# Patient Record
Sex: FEMALE | URBAN - METROPOLITAN AREA
[De-identification: names, ages, dates, MRNs, and addresses within clinical notes are randomized per-mention and may not be internally consistent; named-entity substitution may affect disease eponyms.]

---

## 2018-05-17 ENCOUNTER — APPOINTMENT (RX ONLY)
Dept: URBAN - METROPOLITAN AREA CLINIC 93 | Facility: CLINIC | Age: 58
Setting detail: DERMATOLOGY
End: 2018-05-17

## 2018-05-17 DIAGNOSIS — D22 MELANOCYTIC NEVI: ICD-10-CM

## 2018-05-17 DIAGNOSIS — L81.5 LEUKODERMA, NOT ELSEWHERE CLASSIFIED: ICD-10-CM

## 2018-05-17 DIAGNOSIS — L81.4 OTHER MELANIN HYPERPIGMENTATION: ICD-10-CM

## 2018-05-17 DIAGNOSIS — D18.0 HEMANGIOMA: ICD-10-CM

## 2018-05-17 DIAGNOSIS — L57.8 OTHER SKIN CHANGES DUE TO CHRONIC EXPOSURE TO NONIONIZING RADIATION: ICD-10-CM

## 2018-05-17 DIAGNOSIS — L57.0 ACTINIC KERATOSIS: ICD-10-CM

## 2018-05-17 PROBLEM — D18.01 HEMANGIOMA OF SKIN AND SUBCUTANEOUS TISSUE: Status: ACTIVE | Noted: 2018-05-17

## 2018-05-17 PROBLEM — D22.71 MELANOCYTIC NEVI OF RIGHT LOWER LIMB, INCLUDING HIP: Status: ACTIVE | Noted: 2018-05-17

## 2018-05-17 PROBLEM — D22.39 MELANOCYTIC NEVI OF OTHER PARTS OF FACE: Status: ACTIVE | Noted: 2018-05-17

## 2018-05-17 PROCEDURE — 99214 OFFICE O/P EST MOD 30 MIN: CPT | Mod: 25

## 2018-05-17 PROCEDURE — ? COUNSELING

## 2018-05-17 PROCEDURE — ? LIQUID NITROGEN

## 2018-05-17 PROCEDURE — 17000 DESTRUCT PREMALG LESION: CPT

## 2018-05-17 ASSESSMENT — LOCATION ZONE DERM
LOCATION ZONE: FACE
LOCATION ZONE: LEG
LOCATION ZONE: TRUNK
LOCATION ZONE: NECK

## 2018-05-17 ASSESSMENT — LOCATION SIMPLE DESCRIPTION DERM
LOCATION SIMPLE: LEFT ANTERIOR NECK
LOCATION SIMPLE: ABDOMEN
LOCATION SIMPLE: LEFT KNEE
LOCATION SIMPLE: LEFT CHEEK
LOCATION SIMPLE: RIGHT UPPER BACK
LOCATION SIMPLE: UPPER BACK
LOCATION SIMPLE: LEFT PRETIBIAL REGION
LOCATION SIMPLE: RIGHT PRETIBIAL REGION

## 2018-05-17 ASSESSMENT — LOCATION DETAILED DESCRIPTION DERM
LOCATION DETAILED: RIGHT DISTAL PRETIBIAL REGION
LOCATION DETAILED: LEFT INFERIOR CENTRAL MALAR CHEEK
LOCATION DETAILED: LEFT CENTRAL MALAR CHEEK
LOCATION DETAILED: LEFT PROXIMAL PRETIBIAL REGION
LOCATION DETAILED: LEFT KNEE
LOCATION DETAILED: SUPERIOR THORACIC SPINE
LOCATION DETAILED: PERIUMBILICAL SKIN
LOCATION DETAILED: RIGHT MEDIAL UPPER BACK
LOCATION DETAILED: LEFT INFERIOR ANTERIOR NECK

## 2018-05-17 NOTE — HPI: SKIN LESIONS
How Severe Is Your Skin Lesion?: mild
Have Your Skin Lesions Been Treated?: not been treated
Is This A New Presentation, Or A Follow-Up?: Skin Lesions
Additional History: LOV 1/10/18

## 2019-02-27 ENCOUNTER — APPOINTMENT (RX ONLY)
Dept: URBAN - METROPOLITAN AREA CLINIC 93 | Facility: CLINIC | Age: 59
Setting detail: DERMATOLOGY
End: 2019-02-27

## 2019-02-27 DIAGNOSIS — L81.5 LEUKODERMA, NOT ELSEWHERE CLASSIFIED: ICD-10-CM

## 2019-02-27 DIAGNOSIS — L57.8 OTHER SKIN CHANGES DUE TO CHRONIC EXPOSURE TO NONIONIZING RADIATION: ICD-10-CM

## 2019-02-27 DIAGNOSIS — L57.0 ACTINIC KERATOSIS: ICD-10-CM

## 2019-02-27 DIAGNOSIS — D18.0 HEMANGIOMA: ICD-10-CM

## 2019-02-27 DIAGNOSIS — L82.0 INFLAMED SEBORRHEIC KERATOSIS: ICD-10-CM

## 2019-02-27 DIAGNOSIS — L81.4 OTHER MELANIN HYPERPIGMENTATION: ICD-10-CM

## 2019-02-27 PROBLEM — D18.01 HEMANGIOMA OF SKIN AND SUBCUTANEOUS TISSUE: Status: ACTIVE | Noted: 2019-02-27

## 2019-02-27 PROBLEM — D48.5 NEOPLASM OF UNCERTAIN BEHAVIOR OF SKIN: Status: ACTIVE | Noted: 2019-02-27

## 2019-02-27 PROCEDURE — 17000 DESTRUCT PREMALG LESION: CPT | Mod: 59

## 2019-02-27 PROCEDURE — ? LIQUID NITROGEN

## 2019-02-27 PROCEDURE — 99214 OFFICE O/P EST MOD 30 MIN: CPT | Mod: 25

## 2019-02-27 PROCEDURE — ? BIOPSY BY SHAVE METHOD

## 2019-02-27 PROCEDURE — 11102 TANGNTL BX SKIN SINGLE LES: CPT | Mod: 59

## 2019-02-27 PROCEDURE — ? COUNSELING

## 2019-02-27 PROCEDURE — 17110 DESTRUCTION B9 LES UP TO 14: CPT

## 2019-02-27 ASSESSMENT — LOCATION ZONE DERM
LOCATION ZONE: TRUNK
LOCATION ZONE: LEG
LOCATION ZONE: ARM

## 2019-02-27 ASSESSMENT — LOCATION SIMPLE DESCRIPTION DERM
LOCATION SIMPLE: LEFT POSTERIOR UPPER ARM
LOCATION SIMPLE: LEFT CLAVICULAR SKIN
LOCATION SIMPLE: RIGHT PRETIBIAL REGION
LOCATION SIMPLE: ABDOMEN
LOCATION SIMPLE: RIGHT POSTERIOR UPPER ARM
LOCATION SIMPLE: CHEST
LOCATION SIMPLE: LEFT UPPER BACK
LOCATION SIMPLE: UPPER BACK
LOCATION SIMPLE: RIGHT CLAVICULAR SKIN
LOCATION SIMPLE: LEFT PRETIBIAL REGION
LOCATION SIMPLE: LEFT SHOULDER

## 2019-02-27 ASSESSMENT — LOCATION DETAILED DESCRIPTION DERM
LOCATION DETAILED: RIGHT PROXIMAL PRETIBIAL REGION
LOCATION DETAILED: LEFT SUPERIOR MEDIAL UPPER BACK
LOCATION DETAILED: RIGHT PROXIMAL POSTERIOR UPPER ARM
LOCATION DETAILED: EPIGASTRIC SKIN
LOCATION DETAILED: RIGHT CLAVICULAR SKIN
LOCATION DETAILED: LEFT CLAVICULAR SKIN
LOCATION DETAILED: LEFT PROXIMAL PRETIBIAL REGION
LOCATION DETAILED: LEFT POSTERIOR SHOULDER
LOCATION DETAILED: LEFT PROXIMAL POSTERIOR UPPER ARM
LOCATION DETAILED: LEFT SUPERIOR UPPER BACK
LOCATION DETAILED: INFERIOR THORACIC SPINE
LOCATION DETAILED: RIGHT MEDIAL SUPERIOR CHEST

## 2019-02-27 NOTE — PROCEDURE: LIQUID NITROGEN
Medical Necessity Clause: This procedure was medically necessary because the lesions that were treated were:
Add 52 Modifier (Optional): no
Number Of Freeze-Thaw Cycles: 1 freeze-thaw cycle
Medical Necessity Information: It is in your best interest to select a reason for this procedure from the list below. All of these items fulfill various CMS LCD requirements except the new and changing color options.
Post-Care Instructions: I reviewed with the patient in detail post-care instructions. Patient is to wear sunprotection, and avoid picking at any of the treated lesions. Pt may apply Vaseline to crusted or scabbing areas.
Consent: The patient's consent was obtained including but not limited to risks of crusting, scabbing, blistering, scarring, darker or lighter pigmentary change, recurrence, incomplete removal and infection.
Detail Level: Detailed
Duration Of Freeze Thaw-Cycle (Seconds): 0

## 2019-02-27 NOTE — PROCEDURE: BIOPSY BY SHAVE METHOD
Notification Instructions: Patient will be notified of biopsy results. However, patient instructed to call the office if not contacted within 2 weeks.
Biopsy Method: scissors
Hemostasis: Drysol
Bill For Surgical Tray: no
Dressing: bandage
Was A Bandage Applied: Yes
Silver Nitrate Text: The wound bed was treated with silver nitrate after the biopsy was performed.
Anesthesia Type: 1% lidocaine with epinephrine
Cryotherapy Text: The wound bed was treated with cryotherapy after the biopsy was performed.
Detail Level: Detailed
Curettage Text: The wound bed was treated with curettage after the biopsy was performed.
Lab: 6
Consent: Written consent was obtained and risks were reviewed including but not limited to scarring, infection, bleeding, scabbing, incomplete removal, nerve damage and allergy to anesthesia.
Wound Care: Petrolatum
Electrodesiccation Text: The wound bed was treated with electrodesiccation after the biopsy was performed.
Depth Of Biopsy: dermis
X Size Of Lesion In Cm: 0
Lab Facility: [id number]
Size Of Lesion In Cm: 0.4
Anesthesia Volume In Cc (Will Not Render If 0): 0.5
Electrodesiccation And Curettage Text: The wound bed was treated with electrodesiccation and curettage after the biopsy was performed.
Post-Care Instructions: I reviewed with the patient in detail post-care instructions. Patient is to keep the biopsy site dry overnight, and then apply bacitracin twice daily until healed. Patient may apply hydrogen peroxide soaks to remove any crusting.
Biopsy Type: H and E
Billing Type: Third-Party Bill
Type Of Destruction Used: Curettage

## 2019-03-20 ENCOUNTER — APPOINTMENT (RX ONLY)
Dept: URBAN - METROPOLITAN AREA CLINIC 93 | Facility: CLINIC | Age: 59
Setting detail: DERMATOLOGY
End: 2019-03-20

## 2019-03-20 DIAGNOSIS — Z41.9 ENCOUNTER FOR PROCEDURE FOR PURPOSES OTHER THAN REMEDYING HEALTH STATE, UNSPECIFIED: ICD-10-CM

## 2019-03-20 PROCEDURE — ? BOTOX (U OR CC)

## 2019-03-20 ASSESSMENT — LOCATION SIMPLE DESCRIPTION DERM
LOCATION SIMPLE: RIGHT TEMPLE
LOCATION SIMPLE: LEFT TEMPLE
LOCATION SIMPLE: LEFT CHEEK
LOCATION SIMPLE: GLABELLA
LOCATION SIMPLE: RIGHT CHEEK
LOCATION SIMPLE: RIGHT EYEBROW
LOCATION SIMPLE: RIGHT FOREHEAD
LOCATION SIMPLE: LEFT EYEBROW
LOCATION SIMPLE: LEFT FOREHEAD

## 2019-03-20 ASSESSMENT — LOCATION DETAILED DESCRIPTION DERM
LOCATION DETAILED: RIGHT MEDIAL EYEBROW
LOCATION DETAILED: LEFT SUPERIOR CENTRAL MALAR CHEEK
LOCATION DETAILED: LEFT INFERIOR TEMPLE
LOCATION DETAILED: LEFT FOREHEAD
LOCATION DETAILED: GLABELLA
LOCATION DETAILED: RIGHT SUPERIOR LATERAL MALAR CHEEK
LOCATION DETAILED: RIGHT INFERIOR TEMPLE
LOCATION DETAILED: LEFT CENTRAL EYEBROW
LOCATION DETAILED: LEFT MEDIAL FOREHEAD
LOCATION DETAILED: LEFT MID TEMPLE
LOCATION DETAILED: RIGHT FOREHEAD
LOCATION DETAILED: LEFT SUPERIOR LATERAL MALAR CHEEK
LOCATION DETAILED: RIGHT SUPERIOR FOREHEAD
LOCATION DETAILED: RIGHT CENTRAL EYEBROW

## 2019-03-20 ASSESSMENT — LOCATION ZONE DERM: LOCATION ZONE: FACE

## 2019-04-18 ENCOUNTER — APPOINTMENT (RX ONLY)
Dept: URBAN - METROPOLITAN AREA CLINIC 93 | Facility: CLINIC | Age: 59
Setting detail: DERMATOLOGY
End: 2019-04-18

## 2019-04-18 PROBLEM — C44.519 BASAL CELL CARCINOMA OF SKIN OF OTHER PART OF TRUNK: Status: ACTIVE | Noted: 2019-04-18

## 2019-04-18 PROCEDURE — 13101 CMPLX RPR TRUNK 2.6-7.5 CM: CPT

## 2019-04-18 PROCEDURE — 11602 EXC TR-EXT MAL+MARG 1.1-2 CM: CPT

## 2019-04-18 PROCEDURE — ? PRESCRIPTION

## 2019-04-18 PROCEDURE — ? EXCISION

## 2019-04-18 RX ORDER — CEFADROXIL 500 MG/1
CAPSULE ORAL
Qty: 14 | Refills: 0 | Status: ERX | COMMUNITY
Start: 2019-04-18

## 2019-04-18 RX ADMIN — CEFADROXIL: 500 CAPSULE ORAL at 00:00

## 2019-04-18 NOTE — PROCEDURE: EXCISION
no Bilobed Transposition Flap Text: The defect edges were debeveled with a #15 scalpel blade.  Given the location of the defect and the proximity to free margins a bilobed transposition flap was deemed most appropriate.  Using a sterile surgical marker, an appropriate bilobe flap drawn around the defect.    The area thus outlined was incised deep to adipose tissue with a #15 scalpel blade.  The skin margins were undermined to an appropriate distance in all directions utilizing iris scissors.

## 2019-05-02 ENCOUNTER — APPOINTMENT (RX ONLY)
Dept: URBAN - METROPOLITAN AREA CLINIC 93 | Facility: CLINIC | Age: 59
Setting detail: DERMATOLOGY
End: 2019-05-02

## 2019-05-02 DIAGNOSIS — Z48.02 ENCOUNTER FOR REMOVAL OF SUTURES: ICD-10-CM

## 2019-05-02 PROCEDURE — ? SUTURE REMOVAL (GLOBAL PERIOD)

## 2019-05-02 PROCEDURE — 99024 POSTOP FOLLOW-UP VISIT: CPT

## 2019-05-02 ASSESSMENT — LOCATION ZONE DERM: LOCATION ZONE: TRUNK

## 2019-05-02 ASSESSMENT — LOCATION SIMPLE DESCRIPTION DERM: LOCATION SIMPLE: LEFT UPPER BACK

## 2019-05-02 ASSESSMENT — LOCATION DETAILED DESCRIPTION DERM: LOCATION DETAILED: LEFT SUPERIOR UPPER BACK

## 2019-05-02 NOTE — PROCEDURE: SUTURE REMOVAL (GLOBAL PERIOD)
Add 40699 Cpt? (Important Note: In 2017 The Use Of 57402 Is Being Tracked By Cms To Determine Future Global Period Reimbursement For Global Periods): yes
Body Location Override (Optional - Billing Will Still Be Based On Selected Body Map Location If Applicable): left superior upper back
Detail Level: Detailed

## 2019-09-16 ENCOUNTER — APPOINTMENT (RX ONLY)
Dept: URBAN - METROPOLITAN AREA CLINIC 164 | Facility: CLINIC | Age: 59
Setting detail: DERMATOLOGY
End: 2019-09-16

## 2019-09-16 DIAGNOSIS — Z85.828 PERSONAL HISTORY OF OTHER MALIGNANT NEOPLASM OF SKIN: ICD-10-CM

## 2019-09-16 DIAGNOSIS — L82.1 OTHER SEBORRHEIC KERATOSIS: ICD-10-CM

## 2019-09-16 DIAGNOSIS — L81.4 OTHER MELANIN HYPERPIGMENTATION: ICD-10-CM

## 2019-09-16 DIAGNOSIS — L72.8 OTHER FOLLICULAR CYSTS OF THE SKIN AND SUBCUTANEOUS TISSUE: ICD-10-CM

## 2019-09-16 DIAGNOSIS — D22 MELANOCYTIC NEVI: ICD-10-CM

## 2019-09-16 DIAGNOSIS — L57.0 ACTINIC KERATOSIS: ICD-10-CM

## 2019-09-16 DIAGNOSIS — L85.3 XEROSIS CUTIS: ICD-10-CM

## 2019-09-16 PROBLEM — D22.5 MELANOCYTIC NEVI OF TRUNK: Status: ACTIVE | Noted: 2019-09-16

## 2019-09-16 PROBLEM — D22.71 MELANOCYTIC NEVI OF RIGHT LOWER LIMB, INCLUDING HIP: Status: ACTIVE | Noted: 2019-09-16

## 2019-09-16 PROCEDURE — ? PRESCRIPTION

## 2019-09-16 PROCEDURE — ? COUNSELING

## 2019-09-16 PROCEDURE — 17000 DESTRUCT PREMALG LESION: CPT

## 2019-09-16 PROCEDURE — ? LIQUID NITROGEN

## 2019-09-16 PROCEDURE — 99214 OFFICE O/P EST MOD 30 MIN: CPT | Mod: 25

## 2019-09-16 RX ORDER — AMMONIUM LACTATE 17 G/G
LOTION TOPICAL
Qty: 1 | Refills: 2 | Status: ERX | COMMUNITY
Start: 2019-09-16

## 2019-09-16 RX ADMIN — AMMONIUM LACTATE: 17 LOTION TOPICAL at 00:00

## 2019-09-16 ASSESSMENT — LOCATION SIMPLE DESCRIPTION DERM
LOCATION SIMPLE: LEFT CHEEK
LOCATION SIMPLE: UPPER BACK
LOCATION SIMPLE: LEFT UPPER BACK
LOCATION SIMPLE: LEFT KNEE
LOCATION SIMPLE: LEFT PRETIBIAL REGION
LOCATION SIMPLE: LEFT FOREARM
LOCATION SIMPLE: RIGHT PRETIBIAL REGION
LOCATION SIMPLE: RIGHT FOREARM

## 2019-09-16 ASSESSMENT — LOCATION DETAILED DESCRIPTION DERM
LOCATION DETAILED: LEFT PROXIMAL PRETIBIAL REGION
LOCATION DETAILED: RIGHT MEDIAL DISTAL PRETIBIAL REGION
LOCATION DETAILED: SUPERIOR THORACIC SPINE
LOCATION DETAILED: LEFT SUPERIOR UPPER BACK
LOCATION DETAILED: LEFT LATERAL KNEE
LOCATION DETAILED: LEFT LATERAL MALAR CHEEK
LOCATION DETAILED: LEFT PROXIMAL DORSAL FOREARM
LOCATION DETAILED: INFERIOR THORACIC SPINE
LOCATION DETAILED: RIGHT PROXIMAL PRETIBIAL REGION
LOCATION DETAILED: RIGHT DISTAL DORSAL FOREARM
LOCATION DETAILED: LEFT CENTRAL MALAR CHEEK

## 2019-09-16 ASSESSMENT — LOCATION ZONE DERM
LOCATION ZONE: TRUNK
LOCATION ZONE: FACE
LOCATION ZONE: ARM
LOCATION ZONE: LEG

## 2020-04-29 ENCOUNTER — APPOINTMENT (RX ONLY)
Dept: URBAN - METROPOLITAN AREA CLINIC 93 | Facility: CLINIC | Age: 60
Setting detail: DERMATOLOGY
End: 2020-04-29

## 2020-04-29 DIAGNOSIS — D22 MELANOCYTIC NEVI: ICD-10-CM

## 2020-04-29 DIAGNOSIS — L85.3 XEROSIS CUTIS: ICD-10-CM

## 2020-04-29 DIAGNOSIS — L81.4 OTHER MELANIN HYPERPIGMENTATION: ICD-10-CM

## 2020-04-29 DIAGNOSIS — Z71.89 OTHER SPECIFIED COUNSELING: ICD-10-CM

## 2020-04-29 DIAGNOSIS — L82.1 OTHER SEBORRHEIC KERATOSIS: ICD-10-CM

## 2020-04-29 PROBLEM — D22.61 MELANOCYTIC NEVI OF RIGHT UPPER LIMB, INCLUDING SHOULDER: Status: ACTIVE | Noted: 2020-04-29

## 2020-04-29 PROBLEM — D22.62 MELANOCYTIC NEVI OF LEFT UPPER LIMB, INCLUDING SHOULDER: Status: ACTIVE | Noted: 2020-04-29

## 2020-04-29 PROBLEM — D22.72 MELANOCYTIC NEVI OF LEFT LOWER LIMB, INCLUDING HIP: Status: ACTIVE | Noted: 2020-04-29

## 2020-04-29 PROBLEM — D22.5 MELANOCYTIC NEVI OF TRUNK: Status: ACTIVE | Noted: 2020-04-29

## 2020-04-29 PROBLEM — D22.71 MELANOCYTIC NEVI OF RIGHT LOWER LIMB, INCLUDING HIP: Status: ACTIVE | Noted: 2020-04-29

## 2020-04-29 PROCEDURE — ? COUNSELING

## 2020-04-29 PROCEDURE — ? FULL BODY SKIN EXAM

## 2020-04-29 PROCEDURE — ? RECOMMENDATIONS

## 2020-04-29 PROCEDURE — 99213 OFFICE O/P EST LOW 20 MIN: CPT

## 2020-04-29 ASSESSMENT — LOCATION SIMPLE DESCRIPTION DERM
LOCATION SIMPLE: RIGHT ELBOW
LOCATION SIMPLE: RIGHT FOREARM
LOCATION SIMPLE: LEFT FOREARM
LOCATION SIMPLE: ABDOMEN
LOCATION SIMPLE: CHEST
LOCATION SIMPLE: UPPER BACK
LOCATION SIMPLE: LEFT ELBOW
LOCATION SIMPLE: RIGHT UPPER BACK
LOCATION SIMPLE: RIGHT POSTERIOR UPPER ARM
LOCATION SIMPLE: LEFT POSTERIOR UPPER ARM
LOCATION SIMPLE: RIGHT LOWER BACK
LOCATION SIMPLE: LEFT PRETIBIAL REGION
LOCATION SIMPLE: LEFT UPPER BACK
LOCATION SIMPLE: RIGHT PRETIBIAL REGION

## 2020-04-29 ASSESSMENT — LOCATION DETAILED DESCRIPTION DERM
LOCATION DETAILED: RIGHT DISTAL PRETIBIAL REGION
LOCATION DETAILED: LEFT ELBOW
LOCATION DETAILED: PERIUMBILICAL SKIN
LOCATION DETAILED: LEFT DISTAL PRETIBIAL REGION
LOCATION DETAILED: LEFT PROXIMAL DORSAL FOREARM
LOCATION DETAILED: RIGHT SUPERIOR MEDIAL MIDBACK
LOCATION DETAILED: RIGHT PROXIMAL POSTERIOR UPPER ARM
LOCATION DETAILED: RIGHT PROXIMAL PRETIBIAL REGION
LOCATION DETAILED: RIGHT SUPERIOR UPPER BACK
LOCATION DETAILED: LEFT SUPERIOR MEDIAL UPPER BACK
LOCATION DETAILED: RIGHT DISTAL DORSAL FOREARM
LOCATION DETAILED: RIGHT ELBOW
LOCATION DETAILED: LEFT PROXIMAL PRETIBIAL REGION
LOCATION DETAILED: EPIGASTRIC SKIN
LOCATION DETAILED: LEFT DISTAL POSTERIOR UPPER ARM
LOCATION DETAILED: UPPER STERNUM
LOCATION DETAILED: SUPERIOR THORACIC SPINE

## 2020-04-29 ASSESSMENT — LOCATION ZONE DERM
LOCATION ZONE: ARM
LOCATION ZONE: TRUNK
LOCATION ZONE: LEG

## 2020-04-29 NOTE — PROCEDURE: RECOMMENDATIONS
Detail Level: Zone
Recommendation Preamble: The following recommendations were made during the visit: moisturizer daily

## 2020-11-19 ENCOUNTER — APPOINTMENT (RX ONLY)
Dept: URBAN - METROPOLITAN AREA CLINIC 93 | Facility: CLINIC | Age: 60
Setting detail: DERMATOLOGY
End: 2020-11-19

## 2020-11-19 DIAGNOSIS — Z71.89 OTHER SPECIFIED COUNSELING: ICD-10-CM

## 2020-11-19 DIAGNOSIS — L57.0 ACTINIC KERATOSIS: ICD-10-CM

## 2020-11-19 DIAGNOSIS — D18.0 HEMANGIOMA: ICD-10-CM

## 2020-11-19 DIAGNOSIS — D22 MELANOCYTIC NEVI: ICD-10-CM

## 2020-11-19 DIAGNOSIS — Z85.828 PERSONAL HISTORY OF OTHER MALIGNANT NEOPLASM OF SKIN: ICD-10-CM

## 2020-11-19 DIAGNOSIS — L81.4 OTHER MELANIN HYPERPIGMENTATION: ICD-10-CM

## 2020-11-19 DIAGNOSIS — L82.1 OTHER SEBORRHEIC KERATOSIS: ICD-10-CM

## 2020-11-19 PROBLEM — D22.5 MELANOCYTIC NEVI OF TRUNK: Status: ACTIVE | Noted: 2020-11-19

## 2020-11-19 PROBLEM — D22.71 MELANOCYTIC NEVI OF RIGHT LOWER LIMB, INCLUDING HIP: Status: ACTIVE | Noted: 2020-11-19

## 2020-11-19 PROBLEM — D18.01 HEMANGIOMA OF SKIN AND SUBCUTANEOUS TISSUE: Status: ACTIVE | Noted: 2020-11-19

## 2020-11-19 PROBLEM — D22.72 MELANOCYTIC NEVI OF LEFT LOWER LIMB, INCLUDING HIP: Status: ACTIVE | Noted: 2020-11-19

## 2020-11-19 PROCEDURE — ? DIAGNOSIS COMMENT

## 2020-11-19 PROCEDURE — 17000 DESTRUCT PREMALG LESION: CPT

## 2020-11-19 PROCEDURE — 17003 DESTRUCT PREMALG LES 2-14: CPT

## 2020-11-19 PROCEDURE — ? COUNSELING

## 2020-11-19 PROCEDURE — ? LIQUID NITROGEN

## 2020-11-19 PROCEDURE — 99213 OFFICE O/P EST LOW 20 MIN: CPT | Mod: 25

## 2020-11-19 PROCEDURE — ? SUNSCREEN RECOMMENDATIONS

## 2020-11-19 PROCEDURE — ? FULL BODY SKIN EXAM

## 2020-11-19 PROCEDURE — ? OTHER

## 2020-11-19 ASSESSMENT — LOCATION DETAILED DESCRIPTION DERM
LOCATION DETAILED: LEFT POSTERIOR SHOULDER
LOCATION DETAILED: MIDDLE STERNUM
LOCATION DETAILED: LEFT PROXIMAL PRETIBIAL REGION
LOCATION DETAILED: SUBXIPHOID
LOCATION DETAILED: RIGHT INFERIOR UPPER BACK
LOCATION DETAILED: RIGHT PROXIMAL DORSAL FOREARM
LOCATION DETAILED: LEFT MEDIAL SUPERIOR CHEST
LOCATION DETAILED: LEFT LATERAL UPPER BACK
LOCATION DETAILED: RIGHT POSTERIOR SHOULDER
LOCATION DETAILED: LEFT SUPERIOR UPPER BACK
LOCATION DETAILED: UPPER STERNUM
LOCATION DETAILED: RIGHT LATERAL SUPERIOR CHEST
LOCATION DETAILED: LEFT RIB CAGE
LOCATION DETAILED: LEFT DISTAL CALF
LOCATION DETAILED: LEFT DISTAL POSTERIOR UPPER ARM
LOCATION DETAILED: RIGHT DISTAL POSTERIOR THIGH

## 2020-11-19 ASSESSMENT — LOCATION SIMPLE DESCRIPTION DERM
LOCATION SIMPLE: RIGHT SHOULDER
LOCATION SIMPLE: ABDOMEN
LOCATION SIMPLE: RIGHT FOREARM
LOCATION SIMPLE: LEFT SHOULDER
LOCATION SIMPLE: RIGHT POSTERIOR THIGH
LOCATION SIMPLE: LEFT CALF
LOCATION SIMPLE: LEFT PRETIBIAL REGION
LOCATION SIMPLE: LEFT POSTERIOR UPPER ARM
LOCATION SIMPLE: RIGHT UPPER BACK
LOCATION SIMPLE: CHEST
LOCATION SIMPLE: LEFT UPPER BACK

## 2020-11-19 ASSESSMENT — LOCATION ZONE DERM
LOCATION ZONE: LEG
LOCATION ZONE: ARM
LOCATION ZONE: TRUNK

## 2020-11-19 NOTE — PROCEDURE: LIQUID NITROGEN
Number Of Freeze-Thaw Cycles: 1 freeze-thaw cycle
Post-Care Instructions: I reviewed with the patient in detail post-care instructions. Patient is to wear sunprotection, and avoid picking at any of the treated lesions. Pt may apply Vaseline to crusted or scabbing areas.
Duration Of Freeze Thaw-Cycle (Seconds): 3
Render Note In Bullet Format When Appropriate: No
Consent: The patient's consent was obtained including but not limited to risks of crusting, scabbing, blistering, scarring, darker or lighter pigmentary change, recurrence, incomplete removal and infection.
Detail Level: Generalized

## 2022-02-09 ENCOUNTER — APPOINTMENT (RX ONLY)
Dept: URBAN - METROPOLITAN AREA CLINIC 93 | Facility: CLINIC | Age: 62
Setting detail: DERMATOLOGY
End: 2022-02-09

## 2022-02-09 DIAGNOSIS — D18.0 HEMANGIOMA: ICD-10-CM | Status: STABLE

## 2022-02-09 DIAGNOSIS — L81.4 OTHER MELANIN HYPERPIGMENTATION: ICD-10-CM | Status: STABLE

## 2022-02-09 DIAGNOSIS — L57.0 ACTINIC KERATOSIS: ICD-10-CM

## 2022-02-09 DIAGNOSIS — Z71.89 OTHER SPECIFIED COUNSELING: ICD-10-CM

## 2022-02-09 DIAGNOSIS — D22 MELANOCYTIC NEVI: ICD-10-CM | Status: STABLE

## 2022-02-09 DIAGNOSIS — L82.1 OTHER SEBORRHEIC KERATOSIS: ICD-10-CM | Status: STABLE

## 2022-02-09 DIAGNOSIS — Z41.9 ENCOUNTER FOR PROCEDURE FOR PURPOSES OTHER THAN REMEDYING HEALTH STATE, UNSPECIFIED: ICD-10-CM

## 2022-02-09 PROBLEM — D48.5 NEOPLASM OF UNCERTAIN BEHAVIOR OF SKIN: Status: ACTIVE | Noted: 2022-02-09

## 2022-02-09 PROBLEM — D22.5 MELANOCYTIC NEVI OF TRUNK: Status: ACTIVE | Noted: 2022-02-09

## 2022-02-09 PROBLEM — D18.01 HEMANGIOMA OF SKIN AND SUBCUTANEOUS TISSUE: Status: ACTIVE | Noted: 2022-02-09

## 2022-02-09 PROCEDURE — ? TREATMENT REGIMEN

## 2022-02-09 PROCEDURE — ? ADDITIONAL NOTES

## 2022-02-09 PROCEDURE — ? LIQUID NITROGEN

## 2022-02-09 PROCEDURE — ? SUNSCREEN RECOMMENDATIONS

## 2022-02-09 PROCEDURE — ? BIOPSY BY SHAVE METHOD

## 2022-02-09 PROCEDURE — ? OTHER

## 2022-02-09 PROCEDURE — 99213 OFFICE O/P EST LOW 20 MIN: CPT | Mod: 25

## 2022-02-09 PROCEDURE — 17003 DESTRUCT PREMALG LES 2-14: CPT

## 2022-02-09 PROCEDURE — 11102 TANGNTL BX SKIN SINGLE LES: CPT

## 2022-02-09 PROCEDURE — ? FULL BODY SKIN EXAM

## 2022-02-09 PROCEDURE — ? COSMETIC CONSULTATION: LASER RESURFACING

## 2022-02-09 PROCEDURE — 17000 DESTRUCT PREMALG LESION: CPT | Mod: 59

## 2022-02-09 PROCEDURE — ? COUNSELING

## 2022-02-09 ASSESSMENT — LOCATION SIMPLE DESCRIPTION DERM
LOCATION SIMPLE: UPPER BACK
LOCATION SIMPLE: RIGHT CHEEK
LOCATION SIMPLE: ABDOMEN
LOCATION SIMPLE: LEFT BREAST
LOCATION SIMPLE: RIGHT SHOULDER
LOCATION SIMPLE: LEFT SHOULDER
LOCATION SIMPLE: CHEST

## 2022-02-09 ASSESSMENT — LOCATION DETAILED DESCRIPTION DERM
LOCATION DETAILED: RIGHT POSTERIOR SHOULDER
LOCATION DETAILED: RIGHT LATERAL SUPERIOR CHEST
LOCATION DETAILED: LEFT LATERAL SUPERIOR CHEST
LOCATION DETAILED: EPIGASTRIC SKIN
LOCATION DETAILED: INFERIOR THORACIC SPINE
LOCATION DETAILED: RIGHT SUPERIOR MEDIAL MALAR CHEEK
LOCATION DETAILED: LEFT ANTERIOR SHOULDER
LOCATION DETAILED: LEFT POSTERIOR SHOULDER
LOCATION DETAILED: LEFT MEDIAL BREAST 11-12:00 REGION
LOCATION DETAILED: MIDDLE STERNUM
LOCATION DETAILED: RIGHT ANTERIOR SHOULDER

## 2022-02-09 ASSESSMENT — LOCATION ZONE DERM
LOCATION ZONE: FACE
LOCATION ZONE: TRUNK
LOCATION ZONE: ARM

## 2022-02-09 NOTE — PROCEDURE: OTHER
Other (Free Text): Discussed cosmetic removal quoted:\\n$75 for #1-3\\n$175 for #4-10\\n$300 for single area
Detail Level: Zone
Render Risk Assessment In Note?: yes
Note Text (......Xxx Chief Complaint.): This diagnosis correlates with the

## 2022-02-09 NOTE — PROCEDURE: LIQUID NITROGEN
Application Tool (Optional): Liquid Nitrogen Sprayer
Show Applicator Variable?: Yes
Consent: The patient's consent was obtained including but not limited to risks of crusting, scabbing, blistering, scarring, darker or lighter pigmentary change, recurrence, incomplete removal and infection.
Render Note In Bullet Format When Appropriate: No
Detail Level: Generalized
Number Of Freeze-Thaw Cycles: 1 freeze-thaw cycle
Post-Care Instructions: I reviewed with the patient in detail post-care instructions. Patient is to wear sunprotection, and avoid picking at any of the treated lesions. Pt may apply Vaseline to crusted or scabbing areas.
Duration Of Freeze Thaw-Cycle (Seconds): 3

## 2022-02-09 NOTE — PROCEDURE: ADDITIONAL NOTES
Additional Notes: - Discussed CoolPeel benefits with patient including reduction in the appearance of fine lines and sun damage, minimizing pores and improving overall skin texture. \\n- Patient aware of what to expect after laser procedure including two days of redness, two days of skin tightness, and then two-four days of peeling. Discussed the importance of wearing sunscreen of SPF 35+ following laser procedure. \\n- Patient informed of pricing: $800/treatment. Patient aware that package pricing is also available.
Render Risk Assessment In Note?: no
Detail Level: Simple

## 2023-02-08 ENCOUNTER — APPOINTMENT (RX ONLY)
Dept: URBAN - METROPOLITAN AREA CLINIC 93 | Facility: CLINIC | Age: 63
Setting detail: DERMATOLOGY
End: 2023-02-08

## 2023-02-08 DIAGNOSIS — L81.4 OTHER MELANIN HYPERPIGMENTATION: ICD-10-CM | Status: STABLE

## 2023-02-08 DIAGNOSIS — L82.1 OTHER SEBORRHEIC KERATOSIS: ICD-10-CM | Status: STABLE

## 2023-02-08 DIAGNOSIS — D18.0 HEMANGIOMA: ICD-10-CM | Status: STABLE

## 2023-02-08 DIAGNOSIS — L72.0 EPIDERMAL CYST: ICD-10-CM

## 2023-02-08 DIAGNOSIS — D22 MELANOCYTIC NEVI: ICD-10-CM | Status: STABLE

## 2023-02-08 DIAGNOSIS — L20.89 OTHER ATOPIC DERMATITIS: ICD-10-CM

## 2023-02-08 DIAGNOSIS — Z85.828 PERSONAL HISTORY OF OTHER MALIGNANT NEOPLASM OF SKIN: ICD-10-CM

## 2023-02-08 DIAGNOSIS — Z71.89 OTHER SPECIFIED COUNSELING: ICD-10-CM

## 2023-02-08 DIAGNOSIS — L57.0 ACTINIC KERATOSIS: ICD-10-CM

## 2023-02-08 PROBLEM — D22.5 MELANOCYTIC NEVI OF TRUNK: Status: ACTIVE | Noted: 2023-02-08

## 2023-02-08 PROBLEM — L20.84 INTRINSIC (ALLERGIC) ECZEMA: Status: ACTIVE | Noted: 2023-02-08

## 2023-02-08 PROBLEM — D18.01 HEMANGIOMA OF SKIN AND SUBCUTANEOUS TISSUE: Status: ACTIVE | Noted: 2023-02-08

## 2023-02-08 PROCEDURE — ? DIAGNOSIS COMMENT

## 2023-02-08 PROCEDURE — ? LIQUID NITROGEN

## 2023-02-08 PROCEDURE — ? FULL BODY SKIN EXAM

## 2023-02-08 PROCEDURE — ? SUNSCREEN RECOMMENDATIONS

## 2023-02-08 PROCEDURE — ? PRESCRIPTION

## 2023-02-08 PROCEDURE — 17003 DESTRUCT PREMALG LES 2-14: CPT

## 2023-02-08 PROCEDURE — 17000 DESTRUCT PREMALG LESION: CPT

## 2023-02-08 PROCEDURE — ? OTHER

## 2023-02-08 PROCEDURE — 99213 OFFICE O/P EST LOW 20 MIN: CPT | Mod: 25

## 2023-02-08 PROCEDURE — ? COUNSELING

## 2023-02-08 PROCEDURE — ? TREATMENT REGIMEN

## 2023-02-08 RX ORDER — TRIAMCINOLONE ACETONIDE 1 MG/G
OINTMENT TOPICAL BID
Qty: 80 | Refills: 0 | Status: ERX | COMMUNITY
Start: 2023-02-08

## 2023-02-08 RX ADMIN — TRIAMCINOLONE ACETONIDE: 1 OINTMENT TOPICAL at 00:00

## 2023-02-08 ASSESSMENT — LOCATION DETAILED DESCRIPTION DERM
LOCATION DETAILED: LEFT ANTERIOR SHOULDER
LOCATION DETAILED: RIGHT POSTERIOR SHOULDER
LOCATION DETAILED: EPIGASTRIC SKIN
LOCATION DETAILED: RIGHT CLAVICULAR SKIN
LOCATION DETAILED: RIGHT DISTAL PRETIBIAL REGION
LOCATION DETAILED: INFERIOR THORACIC SPINE
LOCATION DETAILED: LEFT SUPERIOR UPPER BACK
LOCATION DETAILED: LEFT CLAVICULAR SKIN
LOCATION DETAILED: LEFT POSTERIOR SHOULDER
LOCATION DETAILED: MIDDLE STERNUM
LOCATION DETAILED: UPPER STERNUM
LOCATION DETAILED: LEFT MEDIAL SUPERIOR CHEST
LOCATION DETAILED: RIGHT ANTERIOR SHOULDER

## 2023-02-08 ASSESSMENT — LOCATION SIMPLE DESCRIPTION DERM
LOCATION SIMPLE: UPPER BACK
LOCATION SIMPLE: ABDOMEN
LOCATION SIMPLE: LEFT SHOULDER
LOCATION SIMPLE: LEFT UPPER BACK
LOCATION SIMPLE: LEFT CLAVICULAR SKIN
LOCATION SIMPLE: RIGHT PRETIBIAL REGION
LOCATION SIMPLE: RIGHT CLAVICULAR SKIN
LOCATION SIMPLE: CHEST
LOCATION SIMPLE: RIGHT SHOULDER

## 2023-02-08 ASSESSMENT — LOCATION ZONE DERM
LOCATION ZONE: TRUNK
LOCATION ZONE: LEG
LOCATION ZONE: ARM

## 2023-02-08 NOTE — PROCEDURE: LIQUID NITROGEN
Render Note In Bullet Format When Appropriate: No
Post-Care Instructions: I reviewed with the patient in detail post-care instructions. Patient is to wear sunprotection, and avoid picking at any of the treated lesions. Pt may apply Vaseline to crusted or scabbing areas.
Number Of Freeze-Thaw Cycles: 3 freeze-thaw cycles
Duration Of Freeze Thaw-Cycle (Seconds): 3
Consent: The patient's consent was obtained including but not limited to risks of crusting, scabbing, blistering, scarring, darker or lighter pigmentary change, recurrence, incomplete removal and infection.
Show Aperture Variable?: Yes
Detail Level: Generalized

## 2023-06-07 ENCOUNTER — RX ONLY (OUTPATIENT)
Age: 63
Setting detail: RX ONLY
End: 2023-06-07

## 2023-06-07 RX ORDER — TRIAMCINOLONE ACETONIDE 1 MG/G
OINTMENT TOPICAL BID
Qty: 80 | Refills: 0 | Status: ERX

## 2023-09-22 ENCOUNTER — APPOINTMENT (RX ONLY)
Dept: URBAN - METROPOLITAN AREA CLINIC 93 | Facility: CLINIC | Age: 63
Setting detail: DERMATOLOGY
End: 2023-09-22

## 2023-09-22 DIAGNOSIS — L81.4 OTHER MELANIN HYPERPIGMENTATION: ICD-10-CM

## 2023-09-22 DIAGNOSIS — Z71.89 OTHER SPECIFIED COUNSELING: ICD-10-CM

## 2023-09-22 DIAGNOSIS — L82.1 OTHER SEBORRHEIC KERATOSIS: ICD-10-CM

## 2023-09-22 DIAGNOSIS — D22 MELANOCYTIC NEVI: ICD-10-CM

## 2023-09-22 DIAGNOSIS — L259 CONTACT DERMATITIS AND OTHER ECZEMA, UNSPECIFIED CAUSE: ICD-10-CM

## 2023-09-22 DIAGNOSIS — D18.0 HEMANGIOMA: ICD-10-CM

## 2023-09-22 DIAGNOSIS — I78.8 OTHER DISEASES OF CAPILLARIES: ICD-10-CM

## 2023-09-22 DIAGNOSIS — D485 NEOPLASM OF UNCERTAIN BEHAVIOR OF SKIN: ICD-10-CM

## 2023-09-22 PROBLEM — D48.5 NEOPLASM OF UNCERTAIN BEHAVIOR OF SKIN: Status: ACTIVE | Noted: 2023-09-22

## 2023-09-22 PROBLEM — L30.8 OTHER SPECIFIED DERMATITIS: Status: ACTIVE | Noted: 2023-09-22

## 2023-09-22 PROBLEM — D18.01 HEMANGIOMA OF SKIN AND SUBCUTANEOUS TISSUE: Status: ACTIVE | Noted: 2023-09-22

## 2023-09-22 PROBLEM — D22.5 MELANOCYTIC NEVI OF TRUNK: Status: ACTIVE | Noted: 2023-09-22

## 2023-09-22 PROCEDURE — ? PRESCRIPTION

## 2023-09-22 PROCEDURE — ? PRESCRIPTION MEDICATION MANAGEMENT

## 2023-09-22 PROCEDURE — ? FULL BODY SKIN EXAM

## 2023-09-22 PROCEDURE — ? COUNSELING

## 2023-09-22 PROCEDURE — 99214 OFFICE O/P EST MOD 30 MIN: CPT

## 2023-09-22 RX ORDER — TRIAMCINOLONE ACETONIDE 1 MG/G
OINTMENT TOPICAL
Qty: 80 | Refills: 0 | Status: ERX

## 2023-09-22 ASSESSMENT — LOCATION ZONE DERM
LOCATION ZONE: LEG
LOCATION ZONE: FACE
LOCATION ZONE: TRUNK

## 2023-09-22 ASSESSMENT — LOCATION SIMPLE DESCRIPTION DERM
LOCATION SIMPLE: ABDOMEN
LOCATION SIMPLE: UPPER BACK
LOCATION SIMPLE: LEFT PRETIBIAL REGION
LOCATION SIMPLE: RIGHT UPPER BACK
LOCATION SIMPLE: LEFT CHEEK
LOCATION SIMPLE: RIGHT PRETIBIAL REGION

## 2023-09-22 ASSESSMENT — LOCATION DETAILED DESCRIPTION DERM
LOCATION DETAILED: LEFT CENTRAL MALAR CHEEK
LOCATION DETAILED: LEFT DISTAL PRETIBIAL REGION
LOCATION DETAILED: PERIUMBILICAL SKIN
LOCATION DETAILED: INFERIOR THORACIC SPINE
LOCATION DETAILED: RIGHT INFERIOR MEDIAL UPPER BACK
LOCATION DETAILED: RIGHT DISTAL PRETIBIAL REGION
LOCATION DETAILED: LEFT SUPERIOR NASAL CHEEK

## 2023-09-22 NOTE — PROCEDURE: PRESCRIPTION MEDICATION MANAGEMENT
Initiate Treatment: Triamcinolone ointment
Detail Level: Zone
Plan: Pt wearing makeup today\\nStates that lesion has only been present for 2-3 wks \\nWill monitor, bx as warranted
Render In Strict Bullet Format?: No

## 2023-09-24 RX ORDER — TRIAMCINOLONE ACETONIDE 1 MG/G
OINTMENT TOPICAL
Qty: 80 | Refills: 0 | Status: ERX

## 2023-10-10 ENCOUNTER — APPOINTMENT (RX ONLY)
Dept: URBAN - METROPOLITAN AREA CLINIC 93 | Facility: CLINIC | Age: 63
Setting detail: DERMATOLOGY
End: 2023-10-10

## 2023-10-10 DIAGNOSIS — L259 CONTACT DERMATITIS AND OTHER ECZEMA, UNSPECIFIED CAUSE: ICD-10-CM

## 2023-10-10 DIAGNOSIS — D485 NEOPLASM OF UNCERTAIN BEHAVIOR OF SKIN: ICD-10-CM | Status: RESOLVING

## 2023-10-10 PROBLEM — D48.5 NEOPLASM OF UNCERTAIN BEHAVIOR OF SKIN: Status: ACTIVE | Noted: 2023-10-10

## 2023-10-10 PROBLEM — L30.8 OTHER SPECIFIED DERMATITIS: Status: ACTIVE | Noted: 2023-10-10

## 2023-10-10 PROCEDURE — ? FULL BODY SKIN EXAM - DECLINED

## 2023-10-10 PROCEDURE — ? PRESCRIPTION

## 2023-10-10 PROCEDURE — ? TREATMENT REGIMEN

## 2023-10-10 PROCEDURE — ? COUNSELING

## 2023-10-10 PROCEDURE — 99214 OFFICE O/P EST MOD 30 MIN: CPT

## 2023-10-10 PROCEDURE — ? PRESCRIPTION MEDICATION MANAGEMENT

## 2023-10-10 RX ORDER — TRIAMCINOLONE ACETONIDE 5 MG/G
OINTMENT TOPICAL
Qty: 15 | Refills: 2 | Status: ERX | COMMUNITY
Start: 2023-10-10

## 2023-10-10 RX ADMIN — TRIAMCINOLONE ACETONIDE: 5 OINTMENT TOPICAL at 00:00

## 2023-10-10 ASSESSMENT — LOCATION SIMPLE DESCRIPTION DERM
LOCATION SIMPLE: RIGHT PRETIBIAL REGION
LOCATION SIMPLE: LEFT PRETIBIAL REGION
LOCATION SIMPLE: LEFT CHEEK

## 2023-10-10 ASSESSMENT — LOCATION DETAILED DESCRIPTION DERM
LOCATION DETAILED: LEFT SUPERIOR NASAL CHEEK
LOCATION DETAILED: RIGHT DISTAL PRETIBIAL REGION
LOCATION DETAILED: LEFT DISTAL PRETIBIAL REGION

## 2023-10-10 ASSESSMENT — LOCATION ZONE DERM
LOCATION ZONE: LEG
LOCATION ZONE: FACE

## 2023-10-10 NOTE — PROCEDURE: PRESCRIPTION MEDICATION MANAGEMENT
Detail Level: Zone
Plan: nearly resolved clinically\\nslight erythema persists\\nwill continue to monitor\\n(pt instructed to continue aquaphor QD-BID)
Discontinue Regimen: Triamcinolone 0.1% topical ointment
Render In Strict Bullet Format?: No
Modify Regimen: triamcinolone 0.1% topical ointment to 0.5% ointment

## 2023-11-21 ENCOUNTER — APPOINTMENT (RX ONLY)
Dept: URBAN - METROPOLITAN AREA CLINIC 93 | Facility: CLINIC | Age: 63
Setting detail: DERMATOLOGY
End: 2023-11-21

## 2023-11-21 DIAGNOSIS — L259 CONTACT DERMATITIS AND OTHER ECZEMA, UNSPECIFIED CAUSE: ICD-10-CM

## 2023-11-21 PROBLEM — L30.8 OTHER SPECIFIED DERMATITIS: Status: ACTIVE | Noted: 2023-11-21

## 2023-11-21 PROCEDURE — 99214 OFFICE O/P EST MOD 30 MIN: CPT

## 2023-11-21 PROCEDURE — ? COUNSELING

## 2023-11-21 PROCEDURE — ? PRESCRIPTION MEDICATION MANAGEMENT

## 2023-11-21 PROCEDURE — ? TREATMENT REGIMEN

## 2023-11-21 PROCEDURE — ? PRESCRIPTION

## 2023-11-21 RX ORDER — TRIAMCINOLONE ACETONIDE 1 MG/G
OINTMENT TOPICAL
Qty: 454 | Refills: 3 | Status: ERX

## 2023-11-21 ASSESSMENT — LOCATION SIMPLE DESCRIPTION DERM
LOCATION SIMPLE: RIGHT PRETIBIAL REGION
LOCATION SIMPLE: LEFT PRETIBIAL REGION

## 2023-11-21 ASSESSMENT — LOCATION ZONE DERM: LOCATION ZONE: LEG

## 2023-11-21 ASSESSMENT — LOCATION DETAILED DESCRIPTION DERM
LOCATION DETAILED: RIGHT DISTAL PRETIBIAL REGION
LOCATION DETAILED: LEFT DISTAL PRETIBIAL REGION

## 2023-11-21 NOTE — PROCEDURE: PRESCRIPTION MEDICATION MANAGEMENT
Detail Level: Zone
Render In Strict Bullet Format?: No
Modify Regimen: triamcinolone 0.5% ointment to 0.1% ointment (with larger quantity)
Plan: Significant improvement on treatment.\\nPatient instructed to RTC if not well controlled or continuing to improve

## 2024-03-22 ENCOUNTER — APPOINTMENT (RX ONLY)
Dept: URBAN - METROPOLITAN AREA CLINIC 93 | Facility: CLINIC | Age: 64
Setting detail: DERMATOLOGY
End: 2024-03-22

## 2024-03-22 DIAGNOSIS — L85.3 XEROSIS CUTIS: ICD-10-CM | Status: INADEQUATELY CONTROLLED

## 2024-03-22 DIAGNOSIS — L81.4 OTHER MELANIN HYPERPIGMENTATION: ICD-10-CM

## 2024-03-22 DIAGNOSIS — D18.0 HEMANGIOMA: ICD-10-CM

## 2024-03-22 DIAGNOSIS — Z71.89 OTHER SPECIFIED COUNSELING: ICD-10-CM

## 2024-03-22 DIAGNOSIS — L82.1 OTHER SEBORRHEIC KERATOSIS: ICD-10-CM

## 2024-03-22 DIAGNOSIS — D22 MELANOCYTIC NEVI: ICD-10-CM

## 2024-03-22 PROBLEM — D22.5 MELANOCYTIC NEVI OF TRUNK: Status: ACTIVE | Noted: 2024-03-22

## 2024-03-22 PROBLEM — D18.01 HEMANGIOMA OF SKIN AND SUBCUTANEOUS TISSUE: Status: ACTIVE | Noted: 2024-03-22

## 2024-03-22 PROCEDURE — 99214 OFFICE O/P EST MOD 30 MIN: CPT

## 2024-03-22 PROCEDURE — ? COUNSELING

## 2024-03-22 PROCEDURE — ? PRESCRIPTION

## 2024-03-22 PROCEDURE — ? TREATMENT REGIMEN

## 2024-03-22 PROCEDURE — ? FULL BODY SKIN EXAM

## 2024-03-22 RX ORDER — AMMONIUM LACTATE 12 G/100G
LOTION TOPICAL QD
Qty: 400 | Refills: 11 | Status: ERX

## 2024-03-22 ASSESSMENT — LOCATION SIMPLE DESCRIPTION DERM
LOCATION SIMPLE: ABDOMEN
LOCATION SIMPLE: RIGHT UPPER BACK
LOCATION SIMPLE: UPPER BACK

## 2024-03-22 ASSESSMENT — LOCATION ZONE DERM: LOCATION ZONE: TRUNK

## 2024-03-22 ASSESSMENT — LOCATION DETAILED DESCRIPTION DERM
LOCATION DETAILED: INFERIOR THORACIC SPINE
LOCATION DETAILED: RIGHT INFERIOR MEDIAL UPPER BACK
LOCATION DETAILED: PERIUMBILICAL SKIN

## 2024-09-27 ENCOUNTER — APPOINTMENT (RX ONLY)
Dept: URBAN - METROPOLITAN AREA CLINIC 93 | Facility: CLINIC | Age: 64
Setting detail: DERMATOLOGY
End: 2024-09-27

## 2024-09-27 DIAGNOSIS — D22 MELANOCYTIC NEVI: ICD-10-CM

## 2024-09-27 DIAGNOSIS — Z85.828 PERSONAL HISTORY OF OTHER MALIGNANT NEOPLASM OF SKIN: ICD-10-CM

## 2024-09-27 DIAGNOSIS — L81.4 OTHER MELANIN HYPERPIGMENTATION: ICD-10-CM

## 2024-09-27 DIAGNOSIS — Z71.89 OTHER SPECIFIED COUNSELING: ICD-10-CM

## 2024-09-27 DIAGNOSIS — L85.3 XEROSIS CUTIS: ICD-10-CM | Status: INADEQUATELY CONTROLLED

## 2024-09-27 DIAGNOSIS — L82.1 OTHER SEBORRHEIC KERATOSIS: ICD-10-CM

## 2024-09-27 DIAGNOSIS — D18.0 HEMANGIOMA: ICD-10-CM

## 2024-09-27 PROBLEM — D18.01 HEMANGIOMA OF SKIN AND SUBCUTANEOUS TISSUE: Status: ACTIVE | Noted: 2024-09-27

## 2024-09-27 PROBLEM — D22.5 MELANOCYTIC NEVI OF TRUNK: Status: ACTIVE | Noted: 2024-09-27

## 2024-09-27 PROCEDURE — 99214 OFFICE O/P EST MOD 30 MIN: CPT

## 2024-09-27 PROCEDURE — ? SUNSCREEN RECOMMENDATIONS

## 2024-09-27 PROCEDURE — ? DIAGNOSIS COMMENT

## 2024-09-27 PROCEDURE — ? COUNSELING

## 2024-09-27 PROCEDURE — ? FULL BODY SKIN EXAM

## 2024-09-27 PROCEDURE — ? TREATMENT REGIMEN

## 2024-09-27 PROCEDURE — ? PRESCRIPTION MEDICATION MANAGEMENT

## 2024-09-27 ASSESSMENT — LOCATION ZONE DERM: LOCATION ZONE: TRUNK

## 2024-09-27 ASSESSMENT — LOCATION DETAILED DESCRIPTION DERM
LOCATION DETAILED: PERIUMBILICAL SKIN
LOCATION DETAILED: LEFT SUPERIOR UPPER BACK
LOCATION DETAILED: RIGHT INFERIOR MEDIAL UPPER BACK
LOCATION DETAILED: INFERIOR THORACIC SPINE

## 2024-09-27 ASSESSMENT — LOCATION SIMPLE DESCRIPTION DERM
LOCATION SIMPLE: RIGHT UPPER BACK
LOCATION SIMPLE: UPPER BACK
LOCATION SIMPLE: LEFT UPPER BACK
LOCATION SIMPLE: ABDOMEN

## 2024-09-27 NOTE — PROCEDURE: PRESCRIPTION MEDICATION MANAGEMENT
Initiate Treatment: ammonium lactate 12 % lotion QD
Detail Level: Zone
Render In Strict Bullet Format?: No
Plan: ammonium lactate 12 % lotion QD not picked up from pharmacy previously

## 2024-09-27 NOTE — PROCEDURE: COUNSELING
Detail Level: Generalized
Sunscreen Recommendations: EltaMD UV Elements
Chemical Peel Recommendations: VIPeel Precision Plus
Detail Level: Zone

## 2025-03-25 ENCOUNTER — APPOINTMENT (OUTPATIENT)
Dept: URBAN - METROPOLITAN AREA CLINIC 93 | Facility: CLINIC | Age: 65
Setting detail: DERMATOLOGY
End: 2025-03-25

## 2025-03-25 DIAGNOSIS — D22 MELANOCYTIC NEVI: ICD-10-CM

## 2025-03-25 DIAGNOSIS — Z85.828 PERSONAL HISTORY OF OTHER MALIGNANT NEOPLASM OF SKIN: ICD-10-CM

## 2025-03-25 DIAGNOSIS — D18.0 HEMANGIOMA: ICD-10-CM

## 2025-03-25 DIAGNOSIS — L81.4 OTHER MELANIN HYPERPIGMENTATION: ICD-10-CM

## 2025-03-25 DIAGNOSIS — L85.3 XEROSIS CUTIS: ICD-10-CM | Status: INADEQUATELY CONTROLLED

## 2025-03-25 DIAGNOSIS — L82.1 OTHER SEBORRHEIC KERATOSIS: ICD-10-CM

## 2025-03-25 DIAGNOSIS — L73.8 OTHER SPECIFIED FOLLICULAR DISORDERS: ICD-10-CM

## 2025-03-25 DIAGNOSIS — L57.3 POIKILODERMA OF CIVATTE: ICD-10-CM

## 2025-03-25 DIAGNOSIS — Z71.89 OTHER SPECIFIED COUNSELING: ICD-10-CM

## 2025-03-25 PROBLEM — D22.5 MELANOCYTIC NEVI OF TRUNK: Status: ACTIVE | Noted: 2025-03-25

## 2025-03-25 PROBLEM — D18.01 HEMANGIOMA OF SKIN AND SUBCUTANEOUS TISSUE: Status: ACTIVE | Noted: 2025-03-25

## 2025-03-25 PROCEDURE — ? SUNSCREEN RECOMMENDATIONS

## 2025-03-25 PROCEDURE — ? PRESCRIPTION MEDICATION MANAGEMENT

## 2025-03-25 PROCEDURE — 99214 OFFICE O/P EST MOD 30 MIN: CPT

## 2025-03-25 PROCEDURE — ? FULL BODY SKIN EXAM

## 2025-03-25 PROCEDURE — ? PRESCRIPTION

## 2025-03-25 PROCEDURE — ? COUNSELING

## 2025-03-25 RX ORDER — AMMONIUM LACTATE 12 G/100G
LOTION TOPICAL QD
Qty: 400 | Refills: 3 | Status: ERX | COMMUNITY
Start: 2025-03-25

## 2025-03-25 RX ADMIN — AMMONIUM LACTATE: 12 LOTION TOPICAL at 00:00

## 2025-03-25 ASSESSMENT — LOCATION DETAILED DESCRIPTION DERM
LOCATION DETAILED: RIGHT INFERIOR ANTERIOR NECK
LOCATION DETAILED: RIGHT INFERIOR MEDIAL UPPER BACK
LOCATION DETAILED: PERIUMBILICAL SKIN
LOCATION DETAILED: INFERIOR THORACIC SPINE
LOCATION DETAILED: INFERIOR MID FOREHEAD
LOCATION DETAILED: LEFT INFERIOR ANTERIOR NECK

## 2025-03-25 ASSESSMENT — LOCATION SIMPLE DESCRIPTION DERM
LOCATION SIMPLE: UPPER BACK
LOCATION SIMPLE: RIGHT UPPER BACK
LOCATION SIMPLE: ABDOMEN
LOCATION SIMPLE: LEFT ANTERIOR NECK
LOCATION SIMPLE: RIGHT ANTERIOR NECK
LOCATION SIMPLE: INFERIOR FOREHEAD

## 2025-03-25 ASSESSMENT — LOCATION ZONE DERM
LOCATION ZONE: FACE
LOCATION ZONE: NECK
LOCATION ZONE: TRUNK